# Patient Record
Sex: FEMALE | Race: WHITE
[De-identification: names, ages, dates, MRNs, and addresses within clinical notes are randomized per-mention and may not be internally consistent; named-entity substitution may affect disease eponyms.]

---

## 2018-03-05 ENCOUNTER — HOSPITAL ENCOUNTER (OUTPATIENT)
Dept: HOSPITAL 44 - OPSURG | Age: 63
End: 2018-03-05
Attending: INTERNAL MEDICINE
Payer: MEDICARE

## 2018-03-05 DIAGNOSIS — E88.81: ICD-10-CM

## 2018-03-05 DIAGNOSIS — K64.8: ICD-10-CM

## 2018-03-05 DIAGNOSIS — Z12.11: Primary | ICD-10-CM

## 2018-03-05 DIAGNOSIS — E11.9: ICD-10-CM

## 2018-03-05 DIAGNOSIS — I10: ICD-10-CM

## 2018-03-05 PROCEDURE — S1016 NON-PVC INTRAVENOUS ADMINIST: HCPCS

## 2018-03-06 NOTE — GI REPORT
REFERRING PHYSICIAN:

Dr. Dalton Del Rosario

   

GASTROENTEROLOGIST: 

Donald Gerhardt, MD



PROCEDURE MEDICATION:

Propofol as per anesthesia.   



INDICATIONS: 

This 62-year-old woman is referred for her first colonoscopy for screening.   
She denies change in bowel habits.  Patient does have metabolic syndrome.  She 
has hypertension and is a diabetic.   She is 5 feet 5 inches and weighs 140 
kilograms and carries that weight centrally. 



PROCEDURE PERFORMED: 

Colonoscopy.



PROCEDURE: 

An Olympus video colonoscope was advanced to the rectum.  The prep was fair.  
An atonic redundant colon.  It took some maneuvering to finally reach the base 
of the cecum.  The appendiceal orifice and ileocecal valve looked normal.  On 
slow withdrawal, the cecum, ascending colon, and transverse colon with 
redundancy but no obvious intraluminal lesions noted.  The descending colon and 
sigmoid with no obvious intraluminal lesions noted.  Retroflexion of the rectum 
shows hemorrhoids  Patient tolerated the procedure well. 



FINDINGS: 

1.   An atonic redundant colon.

2.   Internal hemorrhoids.



RECOMMENDATIONS: 

1.   Would increase fiber in the diet.

2.   Try to cut back on glycemic carbohydrates with her insulin resistance. 

3.   Consider re-looking at her colon in 10 years, sooner if clinically 
indicated. 





cc:   Dr. Dalton BURNETTE

## 2019-03-13 ENCOUNTER — HOSPITAL ENCOUNTER (OUTPATIENT)
Dept: HOSPITAL 44 - POD | Age: 64
End: 2019-03-13
Attending: PODIATRIST
Payer: MEDICARE

## 2019-03-13 DIAGNOSIS — L60.0: Primary | ICD-10-CM

## 2019-03-13 DIAGNOSIS — E11.65: ICD-10-CM

## 2019-03-13 PROCEDURE — 11730 AVULSION NAIL PLATE SIMPLE 1: CPT

## 2019-03-13 NOTE — OP CLINIC PROGRESS NOTE
SUBJECTIVE:  The patient is a 63-year-old female who presented to the clinic 
yesterday for bilateral ingrown toenails that are not infected at this time but 
are fairly painful. The patient would like these treated with a partial nail 
avulsion and understands that I do not feel comfortable doing a permanent 
partial nail avulsion with chemical matrixectomy due to her diabetes and not 
great blood sugar control. The patient does not admit to any fevers, chills, 
nausea, vomiting, shortness of breath or chest pain at this time. 



OBJECTIVE: 

Vitals: Heart rate 90, respiration rate 20, blood pressure 161/77. Pain level is
4/10. Oxygen saturation 90% on nasal cannula.

Vascular: 2+ DP and PT pulses bilateral feet. Capillary refill time is less than
3 seconds to the toes bilaterally.  There is very mild edema noted at the 
lateral toenail border of the left and right great toes. 

Dermatologic: There is mild red irritation noted on the lateral border of the 
great toenail, left and right. There is no purulence or malodor noted at this 
time. There are no other skin lesions or open lesions noted or of concern at 
this time. 

Musculoskeletal: There is pain on palpation noted on the lateral toenail border 
of the left and right great toes. There are no other gross abnormalities noted 
at this time. 

Neurologic: Light touch sensation is absent to the toes bilaterally. 5.07 
[place] Monofilament Test is absent/negative to plantar feet bilateral 
from previous exam yesterday. 



ASSESSMENT AND PLAN:    

1.  Onychocryptosis bilateral great toes, lateral border.



The risks and benefits of a partial nail avulsion of the lateral border of the 
great toe left and right foot was discussed with the patient that include but 
are not limited to bleeding and infection. The patient also understands that 
this nail may come back and cause a problem again and if it does we can perhaps 
consider a more permanent solution at that time as long as her blood sugars are 
more controlled. The patient signed the consent and it was placed in the chart. 



PROCEDURE #1:  Partial nail avulsion (temporary) of the right hallux, lateral 
nail border. An alcohol swab was utilized to cleanse the base of the hallux and 
6 mL of a 1:1 mix of 2% lidocaine plain and 0.5% Marcaine plain were injected. 
Anesthesia was obtained. Betadine prep was utilized to cleanse the toe and the 
lateral toenail border was avulsed with nail splitters and a hemostat. The site 
was rinsed with a copious amount of normal saline and it was confirmed that 
there was no nail remaining in that pocket. Dressings were then applied 
consisting of triple antibiotic ointment, 4x4 gauze, 2-inch Dennis and 1-inch 
Coban beginning on the toe and extending onto the distal forefoot to help hold 
it on the toe well. The patient tolerated the procedure well. Hemostasis was 
obtained with pressure. The patient was given appropriate instructions verbally 
and on a written form regarding no baths and regarding washing the toes daily 
and applying triple antibiotic ointment and a Band-Aid daily. 



The patient will return to the clinic in 1 week for follow-up and to make sure 
that it is healing well. The patient had no further questions and was happy 
about her experience. 



PROCEDURE #2:  Of note, of course procedure #2 was the identical procedure but 
performed on the lateral toenail border of the great toe of the other (left) 
foot. 



ADDENDUM: Diabetic shoes and inserts prescription were sent yesterday and we 
will follow up with that on the next visit as well as on the patients use of 
her nonscented lotion to help with her dry skin on both feet. We will follow up 
with those on the next visit. 



WARNER PeralesP.M.

(Dictated/Not Signed)



Lindy

D:  03/13/19

T:  03/13/19

Job#:  [id number] & [id number]

MTDD